# Patient Record
Sex: MALE | RURAL
[De-identification: names, ages, dates, MRNs, and addresses within clinical notes are randomized per-mention and may not be internally consistent; named-entity substitution may affect disease eponyms.]

---

## 2022-09-02 ENCOUNTER — APPOINTMENT (RX ONLY)
Dept: RURAL CLINIC 1 | Facility: CLINIC | Age: 22
Setting detail: DERMATOLOGY
End: 2022-09-02

## 2022-09-02 DIAGNOSIS — D22 MELANOCYTIC NEVI: ICD-10-CM

## 2022-09-02 PROBLEM — D22.4 MELANOCYTIC NEVI OF SCALP AND NECK: Status: ACTIVE | Noted: 2022-09-02

## 2022-09-02 PROBLEM — D22.61 MELANOCYTIC NEVI OF RIGHT UPPER LIMB, INCLUDING SHOULDER: Status: ACTIVE | Noted: 2022-09-02

## 2022-09-02 PROBLEM — D22.62 MELANOCYTIC NEVI OF LEFT UPPER LIMB, INCLUDING SHOULDER: Status: ACTIVE | Noted: 2022-09-02

## 2022-09-02 PROBLEM — D22.5 MELANOCYTIC NEVI OF TRUNK: Status: ACTIVE | Noted: 2022-09-02

## 2022-09-02 PROCEDURE — 99202 OFFICE O/P NEW SF 15 MIN: CPT

## 2022-09-02 PROCEDURE — ? COUNSELING

## 2022-09-02 ASSESSMENT — LOCATION SIMPLE DESCRIPTION DERM
LOCATION SIMPLE: RIGHT ANTERIOR NECK
LOCATION SIMPLE: LEFT UPPER BACK
LOCATION SIMPLE: LEFT LOWER BACK
LOCATION SIMPLE: RIGHT LOWER BACK
LOCATION SIMPLE: LEFT SHOULDER
LOCATION SIMPLE: UPPER BACK
LOCATION SIMPLE: RIGHT SHOULDER

## 2022-09-02 ASSESSMENT — LOCATION DETAILED DESCRIPTION DERM
LOCATION DETAILED: RIGHT POSTERIOR SHOULDER
LOCATION DETAILED: SUPERIOR THORACIC SPINE
LOCATION DETAILED: LEFT INFERIOR UPPER BACK
LOCATION DETAILED: LEFT INFERIOR LATERAL UPPER BACK
LOCATION DETAILED: LEFT MID-UPPER BACK
LOCATION DETAILED: RIGHT SUPERIOR LATERAL MIDBACK
LOCATION DETAILED: LEFT SUPERIOR LATERAL MIDBACK
LOCATION DETAILED: RIGHT CLAVICULAR NECK
LOCATION DETAILED: LEFT POSTERIOR SHOULDER
LOCATION DETAILED: LEFT INFERIOR MEDIAL UPPER BACK

## 2022-09-02 ASSESSMENT — LOCATION ZONE DERM
LOCATION ZONE: ARM
LOCATION ZONE: TRUNK
LOCATION ZONE: NECK

## 2024-10-10 ENCOUNTER — OFFICE VISIT (OUTPATIENT)
Dept: URGENT CARE | Age: 24
End: 2024-10-10
Payer: COMMERCIAL

## 2024-10-10 VITALS
SYSTOLIC BLOOD PRESSURE: 118 MMHG | TEMPERATURE: 97.7 F | RESPIRATION RATE: 16 BRPM | WEIGHT: 216 LBS | HEART RATE: 115 BPM | DIASTOLIC BLOOD PRESSURE: 77 MMHG | OXYGEN SATURATION: 96 %

## 2024-10-10 DIAGNOSIS — R09.81 NASAL CONGESTION: Primary | ICD-10-CM

## 2024-10-10 DIAGNOSIS — H66.91 RIGHT OTITIS MEDIA, UNSPECIFIED OTITIS MEDIA TYPE: ICD-10-CM

## 2024-10-10 PROCEDURE — 99203 OFFICE O/P NEW LOW 30 MIN: CPT | Performed by: REGISTERED NURSE

## 2024-10-10 RX ORDER — AZITHROMYCIN 250 MG/1
TABLET, FILM COATED ORAL
Qty: 6 TABLET | Refills: 0 | Status: SHIPPED | OUTPATIENT
Start: 2024-10-10

## 2024-10-10 ASSESSMENT — ENCOUNTER SYMPTOMS
SINUS PRESSURE: 0
COUGH: 0
SORE THROAT: 0
SINUS PAIN: 0
DIARRHEA: 0
NAUSEA: 1
SHORTNESS OF BREATH: 0
RHINORRHEA: 1
SINUS COMPLAINT: 1
FEVER: 0
VOMITING: 0
HEADACHES: 0

## 2024-10-10 NOTE — PROGRESS NOTES
Subjective   Patient ID: Ugo Castellanos is a 23 y.o. male. They present today with a chief complaint of Nasal Congestion, Sinus Problem, and Nausea.    History of Present Illness    History provided by:  Patient  History limited by: na.   used: No    Sinus Problem  Location:  Sinuses  Quality:  Congestion  Severity:  Mild  Onset quality:  Gradual  Duration:  2 days  Timing:  Constant  Progression:  Waxing and waning  Chronicity:  New  Context:  Nasal congestion  Relieved by:  Nothing  Worsened by:  Nothing  Ineffective treatments:  None tried  Associated symptoms: congestion, nausea and rhinorrhea    Associated symptoms: no cough, no diarrhea, no ear pain, no fever, no headaches, no rash, no shortness of breath, no sore throat and no vomiting        Past Medical History  Allergies as of 10/10/2024    (No Known Allergies)       (Not in a hospital admission)       No past medical history on file.    No past surgical history on file.         Review of Systems  Review of Systems   Constitutional:  Negative for fever.   HENT:  Positive for congestion, postnasal drip and rhinorrhea. Negative for ear pain, sinus pressure, sinus pain and sore throat.    Respiratory:  Negative for cough and shortness of breath.    Gastrointestinal:  Positive for nausea. Negative for diarrhea and vomiting.   Skin:  Negative for rash.   Neurological:  Negative for headaches.                                  Objective    Vitals:    10/10/24 1924   BP: 118/77   BP Location: Left arm   Pulse: (!) 115   Resp: 16   Temp: 36.5 °C (97.7 °F)   SpO2: 96%   Weight: 98 kg (216 lb)     No LMP for male patient.    Physical Exam  Vitals and nursing note reviewed.   Constitutional:       Appearance: Normal appearance.   HENT:      Head: Normocephalic.      Right Ear: Hearing, ear canal and external ear normal. Tympanic membrane is erythematous and retracted.      Left Ear: Hearing, tympanic membrane, ear canal and external ear normal.       Nose: Mucosal edema, congestion and rhinorrhea present. No nasal tenderness. Rhinorrhea is clear.      Right Sinus: No maxillary sinus tenderness or frontal sinus tenderness.      Left Sinus: No maxillary sinus tenderness or frontal sinus tenderness.      Mouth/Throat:      Lips: Pink.      Mouth: Mucous membranes are moist.      Pharynx: Oropharynx is clear.   Eyes:      Pupils: Pupils are equal, round, and reactive to light.   Cardiovascular:      Rate and Rhythm: Normal rate and regular rhythm.   Pulmonary:      Effort: Pulmonary effort is normal.      Breath sounds: Normal breath sounds.   Abdominal:      General: Bowel sounds are normal.      Palpations: Abdomen is soft.   Skin:     General: Skin is warm and dry.      Capillary Refill: Capillary refill takes less than 2 seconds.   Neurological:      General: No focal deficit present.      Mental Status: He is alert.   Psychiatric:         Mood and Affect: Mood normal.         Procedures    Point of Care Test & Imaging Results from this visit  No results found for this visit on 10/10/24.   No results found.    Diagnostic study results (if any) were reviewed by Crystal L Severino, APRN-CNP.    Assessment/Plan   Allergies, medications, history, and pertinent labs/EKGs/Imaging reviewed by Crystal L Severino, APRN-CNP.     Medical Decision Making  23-year-old male patient presents with complaints of nasal congestion and nausea.  He states he thought it was his allergies but symptoms are not subsiding.  Patient denies any fevers, sore throat, cough, headache, nausea or vomiting.  Upon assessment it is noted patient has left otitis media, patient is otherwise hemodynamically stable with stable vital signs.  Patient is discharged to home with Rx for Z-Dago and is to follow-up with his PCP should his symptoms persist or worsen, patient verbalizes understanding has no further questions at this time    Orders and Diagnoses  Diagnoses and all orders for this visit:  Nasal  congestion  Right otitis media, unspecified otitis media type  -     azithromycin (Zithromax) 250 mg tablet; Take 2 tabs (500 mg) by mouth today, than 1 daily for 4 days.      Medical Admin Record      Patient disposition: Home    Electronically signed by Crystal L Severino, APRN-CNP  7:33 PM

## 2024-10-30 RX ORDER — FLUTICASONE PROPIONATE 50 MCG
SPRAY, SUSPENSION (ML) NASAL
COMMUNITY
Start: 2023-11-28 | End: 2024-11-01 | Stop reason: ALTCHOICE

## 2024-10-30 RX ORDER — CITALOPRAM 20 MG/1
1 TABLET, FILM COATED ORAL
COMMUNITY
Start: 2024-09-18 | End: 2024-11-01 | Stop reason: SDUPTHER

## 2024-11-01 ENCOUNTER — APPOINTMENT (OUTPATIENT)
Dept: PRIMARY CARE | Facility: CLINIC | Age: 24
End: 2024-11-01

## 2024-11-01 VITALS
DIASTOLIC BLOOD PRESSURE: 76 MMHG | OXYGEN SATURATION: 97 % | HEIGHT: 74 IN | WEIGHT: 219 LBS | HEART RATE: 86 BPM | BODY MASS INDEX: 28.11 KG/M2 | SYSTOLIC BLOOD PRESSURE: 122 MMHG

## 2024-11-01 DIAGNOSIS — J30.89 ENVIRONMENTAL AND SEASONAL ALLERGIES: ICD-10-CM

## 2024-11-01 DIAGNOSIS — Z00.00 ROUTINE ADULT HEALTH MAINTENANCE: Primary | ICD-10-CM

## 2024-11-01 DIAGNOSIS — F41.8 ANXIETY WITH DEPRESSION: ICD-10-CM

## 2024-11-01 DIAGNOSIS — Z23 NEED FOR VACCINATION: ICD-10-CM

## 2024-11-01 PROBLEM — A04.72 C. DIFFICILE COLITIS: Status: ACTIVE | Noted: 2024-11-01

## 2024-11-01 PROCEDURE — 99385 PREV VISIT NEW AGE 18-39: CPT | Performed by: NURSE PRACTITIONER

## 2024-11-01 PROCEDURE — 3008F BODY MASS INDEX DOCD: CPT | Performed by: NURSE PRACTITIONER

## 2024-11-01 PROCEDURE — 90471 IMMUNIZATION ADMIN: CPT | Performed by: NURSE PRACTITIONER

## 2024-11-01 PROCEDURE — 90656 IIV3 VACC NO PRSV 0.5 ML IM: CPT | Performed by: NURSE PRACTITIONER

## 2024-11-01 RX ORDER — CITALOPRAM 20 MG/1
20 TABLET, FILM COATED ORAL
Qty: 90 TABLET | Refills: 1 | Status: SHIPPED | OUTPATIENT
Start: 2024-11-01 | End: 2025-04-30

## 2024-11-01 ASSESSMENT — ENCOUNTER SYMPTOMS
ABDOMINAL PAIN: 0
MYALGIAS: 0
WOUND: 0
CHILLS: 0
FEVER: 0
DIARRHEA: 0
ROS GI COMMENTS: POSITIVE FOR ACID REFLUX
DIFFICULTY URINATING: 0
ADENOPATHY: 0
DIZZINESS: 0
DEPRESSION: 1
SHORTNESS OF BREATH: 0
FATIGUE: 0
EYE PAIN: 0
CONSTIPATION: 0
COUGH: 0
BRUISES/BLEEDS EASILY: 0
JOINT SWELLING: 0
NAUSEA: 0
SEIZURES: 0
ABDOMINAL DISTENTION: 0
WEAKNESS: 0
BACK PAIN: 0
LOSS OF SENSATION IN FEET: 0
TROUBLE SWALLOWING: 0
PALPITATIONS: 0
COLOR CHANGE: 0
OCCASIONAL FEELINGS OF UNSTEADINESS: 0
WHEEZING: 0
HEADACHES: 0

## 2024-11-01 ASSESSMENT — PATIENT HEALTH QUESTIONNAIRE - PHQ9
SUM OF ALL RESPONSES TO PHQ9 QUESTIONS 1 AND 2: 1
2. FEELING DOWN, DEPRESSED OR HOPELESS: SEVERAL DAYS
10. IF YOU CHECKED OFF ANY PROBLEMS, HOW DIFFICULT HAVE THESE PROBLEMS MADE IT FOR YOU TO DO YOUR WORK, TAKE CARE OF THINGS AT HOME, OR GET ALONG WITH OTHER PEOPLE: NOT DIFFICULT AT ALL
1. LITTLE INTEREST OR PLEASURE IN DOING THINGS: NOT AT ALL

## 2024-11-01 ASSESSMENT — COLUMBIA-SUICIDE SEVERITY RATING SCALE - C-SSRS
1. IN THE PAST MONTH, HAVE YOU WISHED YOU WERE DEAD OR WISHED YOU COULD GO TO SLEEP AND NOT WAKE UP?: NO
6. HAVE YOU EVER DONE ANYTHING, STARTED TO DO ANYTHING, OR PREPARED TO DO ANYTHING TO END YOUR LIFE?: NO
2. HAVE YOU ACTUALLY HAD ANY THOUGHTS OF KILLING YOURSELF?: NO

## 2024-11-01 NOTE — PROGRESS NOTES
Do you have:  Any eye problems:    N  2. Frequent nasal congestion or sneezing:  N  3. Difficulty hearing:  N  4. Ear problems:   N  Are you troubled by:  5. Asthma or wheezing:   N  6. Frequent cough:   N  7. Shortness of breath:N  8. Hemoptysis: N  9. Hx of TB: N  Do you have or have you been told you had:  10. High blood pressure: N  11. Heart disease: N  12. Heart murmur: N  Do you ever have:  13. Chest pain or pressure with exertion:N  14. Leg pains with walking up hill: N  15. Fast heartbeat or palpitations:N  16. Varicose veins: N  Do you have or are you troubled by:  17. Difficulty swallowing foods or liquids: N  18. Abdominal pains: N  19. Frequent indigestion or heartburn: Y  20. Constipation: N  21. Diarrhea or loose stools: N  Has there been a definite change:  22. In weight recently: N  23. In bowel movements: N  Have you ever had or been told you have:  24. An ulcer: N  25. Black stools: N  26. Jaundice, hepatitis or liver problems: N  27. Gallstones or gallbladder problems: N  28. Stomach or intestinal problems: N  Have you ever:  29. Vomited blood : N  30. Blood in bowel movements: N  31. Been anemic or been treated for blood problems: N  32. Had sickle cell trait or anemia: N  33. Been refused as a blood donor:   Have you had or do you have:  34. Problems with your kidney, bladder, or prostate: N  35. Loss of control of your urine: N  36. Pain or burning with urination: N  37. Blood in your urine: N  38. Trouble starting flow of urine: N  39. Frequent urination at night: N  Have you ever been treated for or told you had:  40. Venereal disease: N  Do you have:  41. Any skin problems: N  42. Diabetes: N  43. Thyroid disease: N  Are you troubled by:  44. Frequent back pain: N  45. Pain or swelling around joints: N  Have you ever:  46. Broken any bones: Y  Are you troubled by:  47. Frequent headaches: N  48. Dizziness: N  49. Had Seizures or convulsions: N  50. Temporarily lost control of your hand or  foot : N   51. Had a stroke or been paralyzed : N  52. Temporarily lost your ability to speak: N  53. Fainted or lost consciousness: N  Have you ever had:  54. Hallucinations: N  55. Much trouble with Nervousness:  Sometimes   56. Do you take medications for your nerves: Y   57. Trouble falling asleep or staying asleep: N  58. Do you feel tired even after a good night sleep: N  59. Do you often feel down in the dumps or depressed: Y  60. Do you often feel like crying without any reason: N  61. Do you think that you may be using alcohol excessively: N  62. Do you use any street drugs : N     Do have any other medical problems that are concerning to you :      Subjective   Patient ID: Ugo Castellanos is a 23 y.o. male who presents for Establish Care and Annual Exam.    Patient is seen today in order to establish primary care as well as complete an annual physical exam.  Patient recently moved to [place] with his family with whom he lives with.  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  Discussed with patient seasonal allergies.  He reports that his symptoms are typically controlled with over-the-counter Claritin or Zyrtec in addition to Flonase and Sudafed.  No recently reported allergist follow-up completed.  Patient denies any associated shortness of breath, cough, wheezing or chest pain.   Patient reports gaining about 30 pounds over the past year.  He states that he eats relatively frequently throughout the day and has also developed some GERD which is relieved with Prilosec.  He states he wants to start working out as a means to lose weight.  He has helpful that this will help with his acid reflux.  No reported issues with appetite, staying hydrated, bowel or bladder.  He reports liking football and typically only drinks while tailgating or watching the games.  He does not smoke.  Outside of work not a lot of physical activity reported.  No associated shortness of breath or  chest pain on exertion.  Patient states that his anxiety and depression are well-controlled with current medication regiment.  He has seen a counselor in the past but does not feel he needs to do so at this time.  Good support system reported with family.  Medications reviewed.  No other acute concerns voiced at this time           No current outpatient medications on file prior to visit.     No current facility-administered medications on file prior to visit.       Past Medical History:   Diagnosis Date    Depression         Past Surgical History:   Procedure Laterality Date    WISDOM TOOTH EXTRACTION          Family History   Problem Relation Name Age of Onset    Breast cancer Mother Chikis Castellanos     Colon cancer Paternal Grandfather          Review of Systems   Constitutional:  Negative for chills, fatigue and fever.   HENT:  Negative for dental problem and trouble swallowing.    Eyes:  Negative for pain and visual disturbance.   Respiratory:  Negative for cough, shortness of breath and wheezing.    Cardiovascular:  Negative for chest pain, palpitations and leg swelling.   Gastrointestinal:  Negative for abdominal distention, abdominal pain, constipation, diarrhea and nausea.        Positive for acid reflux   Endocrine: Negative for cold intolerance and heat intolerance.   Genitourinary:  Negative for difficulty urinating.   Musculoskeletal:  Negative for back pain, gait problem, joint swelling and myalgias.   Skin:  Negative for color change, pallor, rash and wound.   Allergic/Immunologic: Positive for environmental allergies. Negative for food allergies.   Neurological:  Negative for dizziness, seizures, weakness and headaches.   Hematological:  Negative for adenopathy. Does not bruise/bleed easily.   Psychiatric/Behavioral:  Negative for behavioral problems.         Positive for anxiety/ depression (well controlled)   All other systems reviewed and are negative.      Objective   /76   Pulse 86   Ht  "1.88 m (6' 2\")   Wt 99.3 kg (219 lb)   SpO2 97%   BMI 28.12 kg/m²     Physical Exam  Constitutional:       General: He is not in acute distress.     Appearance: Normal appearance. He is not toxic-appearing.   HENT:      Head: Normocephalic and atraumatic.      Right Ear: Tympanic membrane, ear canal and external ear normal.      Left Ear: Tympanic membrane, ear canal and external ear normal.      Nose: Nose normal.      Mouth/Throat:      Mouth: Mucous membranes are moist.      Pharynx: Oropharynx is clear.   Eyes:      Extraocular Movements: Extraocular movements intact.      Conjunctiva/sclera: Conjunctivae normal.      Pupils: Pupils are equal, round, and reactive to light.   Cardiovascular:      Rate and Rhythm: Normal rate and regular rhythm.      Pulses: Normal pulses.      Heart sounds: Normal heart sounds. No murmur heard.  Pulmonary:      Effort: Pulmonary effort is normal.      Breath sounds: Normal breath sounds. No wheezing.   Abdominal:      General: Bowel sounds are normal.      Palpations: Abdomen is soft.   Musculoskeletal:         General: No swelling. Normal range of motion.      Cervical back: Normal range of motion and neck supple.   Skin:     General: Skin is warm and dry.   Neurological:      General: No focal deficit present.      Mental Status: He is alert and oriented to person, place, and time. Mental status is at baseline.      Cranial Nerves: No cranial nerve deficit.      Motor: No weakness.   Psychiatric:         Mood and Affect: Mood normal.         Behavior: Behavior normal.         Thought Content: Thought content normal.         Judgment: Judgment normal.         Assessment/Plan   Problem List Items Addressed This Visit             ICD-10-CM    Anxiety with depression F41.8     Stable on current Celexa dosing.  Patient declined the need for counseling follow-up at this time.  Provider to be notified for any persistent/worsening mood concerns.         Relevant Medications    " citalopram (CeleXA) 20 mg tablet    Routine adult health maintenance - Primary Z00.00     No indication for screening blood work at this time.  Will continue to monitor as appropriate         Environmental and seasonal allergies J30.89     Stable with current over-the-counter regiment.  Consider allergy follow-up for any persistent/worsening symptoms          Other Visit Diagnoses         Codes    Need for vaccination     Z23    Relevant Orders    Flu vaccine, trivalent, preservative free, age 6 months and greater (Fluarix/Fluzone/Flulaval) (Completed)

## 2024-11-13 PROBLEM — Z00.00 ROUTINE ADULT HEALTH MAINTENANCE: Status: ACTIVE | Noted: 2024-11-13

## 2024-11-13 PROBLEM — J30.89 ENVIRONMENTAL AND SEASONAL ALLERGIES: Status: ACTIVE | Noted: 2024-11-13

## 2024-11-14 NOTE — ASSESSMENT & PLAN NOTE
Stable on current Celexa dosing.  Patient declined the need for counseling follow-up at this time.  Provider to be notified for any persistent/worsening mood concerns.

## 2024-11-14 NOTE — ASSESSMENT & PLAN NOTE
Stable with current over-the-counter regiment.  Consider allergy follow-up for any persistent/worsening symptoms

## 2025-01-27 ENCOUNTER — OFFICE VISIT (OUTPATIENT)
Dept: URGENT CARE | Age: 25
End: 2025-01-27
Payer: COMMERCIAL

## 2025-01-27 VITALS
WEIGHT: 220 LBS | DIASTOLIC BLOOD PRESSURE: 68 MMHG | OXYGEN SATURATION: 98 % | SYSTOLIC BLOOD PRESSURE: 111 MMHG | BODY MASS INDEX: 28.25 KG/M2 | TEMPERATURE: 98.4 F | RESPIRATION RATE: 19 BRPM | HEART RATE: 112 BPM

## 2025-01-27 DIAGNOSIS — R50.9 FEVER, UNSPECIFIED FEVER CAUSE: ICD-10-CM

## 2025-01-27 DIAGNOSIS — R05.9 COUGH, UNSPECIFIED TYPE: ICD-10-CM

## 2025-01-27 DIAGNOSIS — H61.21 IMPACTED CERUMEN OF RIGHT EAR: Primary | ICD-10-CM

## 2025-01-27 DIAGNOSIS — H66.91 RIGHT OTITIS MEDIA, UNSPECIFIED OTITIS MEDIA TYPE: ICD-10-CM

## 2025-01-27 LAB
POC RAPID INFLUENZA A: NEGATIVE
POC RAPID INFLUENZA B: NEGATIVE
POC SARS-COV-2 AG BINAX: NORMAL

## 2025-01-27 PROCEDURE — 87811 SARS-COV-2 COVID19 W/OPTIC: CPT

## 2025-01-27 PROCEDURE — 1036F TOBACCO NON-USER: CPT

## 2025-01-27 PROCEDURE — 99213 OFFICE O/P EST LOW 20 MIN: CPT

## 2025-01-27 PROCEDURE — 87804 INFLUENZA ASSAY W/OPTIC: CPT

## 2025-01-27 RX ORDER — ACETAMINOPHEN 500 MG
1000 TABLET ORAL ONCE
Status: COMPLETED | OUTPATIENT
Start: 2025-01-27 | End: 2025-01-27

## 2025-01-27 RX ORDER — AZITHROMYCIN 250 MG/1
TABLET, FILM COATED ORAL
Qty: 6 TABLET | Refills: 0 | Status: SHIPPED | OUTPATIENT
Start: 2025-01-27

## 2025-01-27 RX ADMIN — Medication 1000 MG: at 16:05

## 2025-01-27 NOTE — PROGRESS NOTES
Subjective   Patient ID: Ugo Castellanos is a 24 y.o. male. They present today with a chief complaint of Fever (Started yesterday, 102F at home, mild cough, fatigue, no other symptoms.).    History of Present Illness  See mdm           Past Medical History  Allergies as of 01/27/2025 - Reviewed 01/27/2025   Allergen Reaction Noted    Amoxicillin Diarrhea 11/01/2024       (Not in a hospital admission)       Past Medical History:   Diagnosis Date    Depression        Past Surgical History:   Procedure Laterality Date    WISDOM TOOTH EXTRACTION          reports that he has never smoked. He has never used smokeless tobacco. He reports current alcohol use of about 2.0 standard drinks of alcohol per week. He reports that he does not currently use drugs after having used the following drugs: Marijuana.    Review of Systems  Review of Systems   All other systems reviewed and are negative.                                 Objective    Vitals:    01/27/25 1525 01/27/25 1628   BP: 111/68    BP Location: Left arm    Patient Position: Sitting    BP Cuff Size: Small adult    Pulse: (!) 123 (!) 112   Resp: 19    Temp: 36.9 °C (98.4 °F)    TempSrc: Oral    SpO2: 98%    Weight: 99.8 kg (220 lb)      No LMP for male patient.    Physical Exam  Vitals and nursing note reviewed.   Constitutional:       General: He is not in acute distress.     Appearance: Normal appearance. He is normal weight. He is not ill-appearing, toxic-appearing or diaphoretic.   HENT:      Head: Normocephalic and atraumatic.      Right Ear: Tympanic membrane, ear canal and external ear normal. There is impacted cerumen.      Left Ear: Tympanic membrane, ear canal and external ear normal.      Nose: Nose normal.      Mouth/Throat:      Mouth: Mucous membranes are moist.      Pharynx: No oropharyngeal exudate or posterior oropharyngeal erythema.   Eyes:      General: No scleral icterus.        Right eye: No discharge.         Left eye: No discharge.      Extraocular  Movements: Extraocular movements intact.      Conjunctiva/sclera: Conjunctivae normal.      Pupils: Pupils are equal, round, and reactive to light.   Cardiovascular:      Rate and Rhythm: Regular rhythm. Tachycardia present.      Pulses: Normal pulses.      Heart sounds: No murmur heard.     No friction rub. No gallop.   Pulmonary:      Effort: Pulmonary effort is normal. No respiratory distress.      Breath sounds: No stridor. No wheezing, rhonchi or rales.   Abdominal:      General: Abdomen is flat.      Tenderness: There is no abdominal tenderness. There is no guarding or rebound.   Musculoskeletal:      Cervical back: Normal range of motion and neck supple. No rigidity or tenderness.      Right lower leg: No edema.      Left lower leg: No edema.      Comments: No calf or popliteal tenderness.  No lower extremity edema. LE WWP, sensation intact capillary fill time less than 2 seconds    Skin:     General: Skin is warm and dry.      Capillary Refill: Capillary refill takes less than 2 seconds.   Neurological:      General: No focal deficit present.      Mental Status: He is alert.   Psychiatric:         Mood and Affect: Mood normal.         Behavior: Behavior normal.         Procedures    Point of Care Test & Imaging Results from this visit  Results for orders placed or performed in visit on 01/27/25   POCT Influenza A/B manually resulted   Result Value Ref Range    POC Rapid Influenza A Negative Negative    POC Rapid Influenza B Negative Negative   POCT Covid-19 Rapid Antigen   Result Value Ref Range    POC BREANN-COV-2 AG  Presumptive negative test for SARS-CoV-2 (no antigen detected)     Presumptive negative test for SARS-CoV-2 (no antigen detected)      No results found.    Diagnostic study results (if any) were reviewed by Josephine Zamarripa PA-C.    Assessment/Plan   Allergies, medications, history, and pertinent labs/EKGs/Imaging reviewed by Josephine Zamarripa PA-C.     Medical Decision Making  24 year old male  presents with complaint of cough, nasal congestion and fever.  Symptoms began yesterday evening around 7 pm.  102 fever yesterday.  No CP, SOB.  Minimal sputum production.  He has history of frequent ear infections.  Exam with right cerumen impaction, flushed with right otitis media following clearance of cerumen.  TM is intact.  External auditory canals and mastoids normal.  Remainder of exam is benign.  Offered, discussed chest x-ray given tachycardia, fever and cough.  Patient does not feel cough is very severe.  He prefers trial treatment for otitis media and will return for chest x-ray if cough worsens.  He reports Clostridium difficile infection with amoxicillin in the past and requests azithromycin instead.  He states that he is taken this several times in the past and tolerates it well.  No features of meningitis, bacterial sinusitis, pneumonia, reactive airway or other emergency.  Encouraged follow-up with primary care provider.  Discussed indications for presentation to emergency department.  Discharged good condition agreeable to plan as discussed.       Orders and Diagnoses  Diagnoses and all orders for this visit:  Impacted cerumen of right ear  Cough, unspecified type  -     POCT Influenza A/B manually resulted  -     POCT Covid-19 Rapid Antigen  Fever, unspecified fever cause  -     acetaminophen (Tylenol) tablet 1,000 mg  Right otitis media, unspecified otitis media type  -     azithromycin (Zithromax) 250 mg tablet; Take 500 mg (2 tabs) first day, take 1 tab once daily for four days following      Medical Admin Record  Administrations This Visit       acetaminophen (Tylenol) tablet 1,000 mg       Admin Date  01/27/2025 Action  Given Dose  1,000 mg Route  oral Documented By  Salvador Evangelista MA                    Patient disposition: Home    Electronically signed by Josephine Zamarripa PA-C  4:34 PM

## 2025-01-28 ENCOUNTER — TELEPHONE (OUTPATIENT)
Dept: PRIMARY CARE | Facility: CLINIC | Age: 25
End: 2025-01-28
Payer: COMMERCIAL

## 2025-01-28 NOTE — TELEPHONE ENCOUNTER
Pt was informed to continue with the meds and yasmine on Monday if not better and/or sooner if fevers or symptoms get worse

## 2025-01-28 NOTE — TELEPHONE ENCOUNTER
"Pt states he was running a fever of 104 and went to Urgent Care.  Tested neg for covid and influenza's.  They started him on an antibiotic, and he has been \"piggy-backing\" motrin and tylenol.  His temperature still only went down to 101.5 and is now 102.9 this afternoon again.  He is not sure if this is normal?  Or when to be concerned?   " no

## 2025-01-30 ENCOUNTER — HOSPITAL ENCOUNTER (OUTPATIENT)
Dept: RADIOLOGY | Facility: CLINIC | Age: 25
Discharge: HOME | End: 2025-01-30
Payer: COMMERCIAL

## 2025-01-30 ENCOUNTER — OFFICE VISIT (OUTPATIENT)
Dept: PRIMARY CARE | Facility: CLINIC | Age: 25
End: 2025-01-30
Payer: COMMERCIAL

## 2025-01-30 VITALS
DIASTOLIC BLOOD PRESSURE: 61 MMHG | TEMPERATURE: 98.5 F | BODY MASS INDEX: 28.13 KG/M2 | WEIGHT: 219.13 LBS | SYSTOLIC BLOOD PRESSURE: 89 MMHG | OXYGEN SATURATION: 95 % | HEART RATE: 85 BPM

## 2025-01-30 DIAGNOSIS — J06.9 VIRAL URI WITH COUGH: ICD-10-CM

## 2025-01-30 DIAGNOSIS — J06.9 VIRAL URI WITH COUGH: Primary | ICD-10-CM

## 2025-01-30 PROCEDURE — 99213 OFFICE O/P EST LOW 20 MIN: CPT | Performed by: FAMILY MEDICINE

## 2025-01-30 PROCEDURE — 71046 X-RAY EXAM CHEST 2 VIEWS: CPT

## 2025-01-30 RX ORDER — LACTOBACILLUS RHAMNOSUS GG 10B CELL
1 CAPSULE ORAL DAILY
COMMUNITY

## 2025-01-30 RX ORDER — CETIRIZINE HYDROCHLORIDE 10 MG/1
10 TABLET ORAL DAILY
COMMUNITY

## 2025-01-30 RX ORDER — BENZONATATE 100 MG/1
100-200 CAPSULE ORAL 3 TIMES DAILY PRN
Qty: 60 CAPSULE | Refills: 0 | Status: SHIPPED | OUTPATIENT
Start: 2025-01-30 | End: 2025-02-09

## 2025-01-30 ASSESSMENT — ENCOUNTER SYMPTOMS
SHORTNESS OF BREATH: 0
SINUS PAIN: 1
SINUS PRESSURE: 1
CHILLS: 1
RHINORRHEA: 1
FEVER: 1
COUGH: 1
DIARRHEA: 1

## 2025-01-30 NOTE — PATIENT INSTRUCTIONS
Anticipate usual course of viral upper respiratory illness. Worst of symptoms typically lasting for about 7 days, often peaking around day 5. Improvement should typically begin between days 7-10 of illness. Resolution of symptoms typically within 2-3 weeks. Some things that might indicate something else is going on, or has developed: Fever starting after day 5 of illness, worsening of condition after day 7 of illness, not beginning to have improvement by day 10 of illness, fever (100.4 or higher) going away for 48 hours then returning, sharp stabbing ear pain, pain/redness/swelling localized over a sinus cavity, or severe or rapidly worsening symptoms.    If appropriate, Afrin/oxymetazoline for 3 days can be very helpful, for teens and adults (children 6-12 only with adult supervision). Nasal steroids (e.g., Flonase, Nasacort, etc.) may be a safer option, though not as effective. Nasal saline can also help thin the mucus to make it drain out more easily. A nasal antihistamine spray (e.g., Azelastine) can be very helpful for allergies, as can nasal steroid sprays.    For a sore throat, you may try salt water gargles, straight honey. Adults may try Cepacol lozenges, Chloraseptic throat spray.    For a cough, you may try Delsym/dextromethorphan. Adults may try Coricidin HBP, Benzonatate/Tessalon Perles, cough drops.    Please return or seek medical attention if symptoms persist, change, worsen, or return. For emergencies, call 9-1-1 or go to the nearest Emergency Room. Please schedule additional problem-focused appointment(s) to address additional problem(s).    Avoid taking Biotin (a vitamin, shows up particularly in hair/nail supplements) for a week prior to any blood tests, as it can interfere with certain results. Fasting for labs means 12 hours, nothing to eat or drink, except water and medications, unless directed otherwise.    For assistance with scheduling referrals or consultations, please call [phone].  For laboratory, radiology, and other tests, please call [phone] ([phone] for pediatrics). Please review prescription inserts and published information for possible adverse effects of all medications. Return after testing or consultation to review results and recommendations, if symptoms persist, change, worsen, or return, or if you have any question or concern. If you do not get results within 7-10 days, or you have any question or concern, please send a message, call the office ([phone]), or return to the office for a follow-up appointment. For non-emergencies, you may call the office. For emergencies, call 9-1-1 or go to the nearest Emergency Department. Please schedule additional appointment(s) to address concern(s) not addressed today. An annual Wellness visit is strongly recommended. A Wellness visit should be dedicated to addressing routine health maintenance matters (e.g., cancer screenings, cardiovascular screening, etc.). Problem-focused visits, typically prompted by symptoms or specific concerns, are usually conducted separately, particularly if multiple or complex problems need to be addressed.    In general, results are not released or discussed over the telephone, but at an appointment or via  [employer]. Results of tests done through [facility] are released via  [employer] (see below).  [email or web address]   [employer] support line: [phone]

## 2025-01-30 NOTE — PROGRESS NOTES
Subjective   Patient ID: Ugo Castellanos is a 24 y.o. male who presents for Fever (Pt presents with mother in Urgent Care F/U, c/o fevers , some cough, negative Covid and Flu done Monday at Urgent Care.).  HPI Historian(s): Self and Mother    c/o fever up to 104 F, cough, fatigue, chills, emesis x1, frontal sinus pressure. Last fever this morning 103.5 F, took Advil or Tylenol.  Onset of symptoms was 4 days ago.  Symptoms have been unchanged since that time.  Severity is moderate  Tried anti-pyretics (helped), Azithromycin (didn't help), Robitussin (helped).   Improved or relieved by Robitussin.  Exacerbated by nothing.  Also c/o decreased appetite, diarrhea, h/o ear infections.  Denies ill contacts, ear pain  Denies chest/side/back pain, pain with deep inhalation, SOB, difficulty breathing, leg pain/swelling/bruising/redness.   Has 2 more doses of Azithromycin.    Review of Systems   Constitutional:  Positive for chills and fever.   HENT:  Positive for congestion, rhinorrhea, sinus pressure and sinus pain.    Respiratory:  Positive for cough. Negative for shortness of breath.    Cardiovascular:  Negative for chest pain.   Gastrointestinal:  Positive for diarrhea.     No LMP for male patient.    Patient Care Team:  ANTHONY Terrazas-CNP as PCP - General (Internal Medicine)    Current Outpatient Medications   Medication Instructions    azithromycin (Zithromax) 250 mg tablet Take 500 mg (2 tabs) first day, take 1 tab once daily for four days following    benzonatate (TESSALON) 100-200 mg, oral, 3 times daily PRN, Do not crush or chew.    cetirizine (ZYRTEC) 10 mg, Daily    citalopram (CELEXA) 20 mg, oral, Daily (0630)    lactobacillus (Culturelle) 10 billion cell capsule 1 capsule, Daily       Objective   BP 89/61   Pulse 85   Temp 36.9 °C (98.5 °F)   Wt 99.4 kg (219 lb 2 oz)   SpO2 95%   BMI 28.13 kg/m²           Physical Exam  Vitals and nursing note reviewed.   Constitutional:       General: He is not in  acute distress.     Appearance: Normal appearance. He is not diaphoretic.      Comments: No assistive device presently being used.   HENT:      Head: Normocephalic and atraumatic.      Right Ear: Tympanic membrane, ear canal and external ear normal. There is no impacted cerumen.      Left Ear: Tympanic membrane, ear canal and external ear normal. There is no impacted cerumen.      Nose: Congestion and rhinorrhea present.      Mouth/Throat:      Mouth: Mucous membranes are moist.      Pharynx: Oropharynx is clear. No posterior oropharyngeal erythema.   Eyes:      General: No scleral icterus.     Extraocular Movements: Extraocular movements intact.      Conjunctiva/sclera: Conjunctivae normal.   Cardiovascular:      Rate and Rhythm: Normal rate and regular rhythm.      Heart sounds: Normal heart sounds.   Pulmonary:      Effort: Pulmonary effort is normal. No respiratory distress.      Breath sounds: Normal breath sounds. No wheezing, rhonchi or rales.   Abdominal:      General: Bowel sounds are normal. There is no distension.      Palpations: Abdomen is soft. There is no mass.      Tenderness: There is abdominal tenderness (mild LLQ). There is no guarding or rebound.   Musculoskeletal:      Cervical back: Normal range of motion and neck supple. No tenderness.      Right lower leg: No edema.      Left lower leg: No edema.   Lymphadenopathy:      Cervical: No cervical adenopathy.   Skin:     General: Skin is warm and dry.      Coloration: Skin is not jaundiced.   Neurological:      General: No focal deficit present.      Mental Status: He is alert and oriented to person, place, and time. Mental status is at baseline.   Psychiatric:         Mood and Affect: Mood normal.         Behavior: Behavior normal.         Thought Content: Thought content normal.         Assessment & Plan  Viral URI with cough  Day 4, supportive care. Defers CXR.  Orders:    benzonatate (Tessalon) 100 mg capsule; Take 1-2 capsules (100-200 mg) by  mouth 3 times a day as needed for cough for up to 10 days. Do not crush or chew.    XR chest 2 views; Future

## 2025-01-31 DIAGNOSIS — J18.9 PNEUMONIA OF RIGHT UPPER LOBE DUE TO INFECTIOUS ORGANISM: Primary | ICD-10-CM

## 2025-01-31 PROBLEM — J06.9 VIRAL URI WITH COUGH: Status: ACTIVE | Noted: 2025-01-31

## 2025-01-31 RX ORDER — AZITHROMYCIN 500 MG/1
500 TABLET, FILM COATED ORAL DAILY
Qty: 5 TABLET | Refills: 0 | Status: SHIPPED | OUTPATIENT
Start: 2025-01-31 | End: 2025-02-05

## 2025-01-31 RX ORDER — CEFPODOXIME PROXETIL 200 MG/1
200 TABLET, FILM COATED ORAL 2 TIMES DAILY
Qty: 20 TABLET | Refills: 0 | Status: SHIPPED | OUTPATIENT
Start: 2025-01-31 | End: 2025-02-10

## 2025-02-03 ENCOUNTER — APPOINTMENT (OUTPATIENT)
Dept: RADIOLOGY | Facility: HOSPITAL | Age: 25
End: 2025-02-03
Payer: COMMERCIAL

## 2025-02-03 ENCOUNTER — HOSPITAL ENCOUNTER (EMERGENCY)
Facility: HOSPITAL | Age: 25
Discharge: HOME | End: 2025-02-03
Payer: COMMERCIAL

## 2025-02-03 VITALS
HEART RATE: 101 BPM | SYSTOLIC BLOOD PRESSURE: 117 MMHG | HEIGHT: 74 IN | DIASTOLIC BLOOD PRESSURE: 71 MMHG | TEMPERATURE: 99 F | RESPIRATION RATE: 18 BRPM | BODY MASS INDEX: 27.47 KG/M2 | OXYGEN SATURATION: 95 % | WEIGHT: 214.07 LBS

## 2025-02-03 DIAGNOSIS — J18.9 PNEUMONIA DUE TO INFECTIOUS ORGANISM, UNSPECIFIED LATERALITY, UNSPECIFIED PART OF LUNG: Primary | ICD-10-CM

## 2025-02-03 LAB
FLUAV RNA RESP QL NAA+PROBE: NOT DETECTED
FLUBV RNA RESP QL NAA+PROBE: NOT DETECTED
RSV RNA RESP QL NAA+PROBE: NOT DETECTED
SARS-COV-2 RNA RESP QL NAA+PROBE: NOT DETECTED

## 2025-02-03 PROCEDURE — 71046 X-RAY EXAM CHEST 2 VIEWS: CPT | Mod: FOREIGN READ | Performed by: RADIOLOGY

## 2025-02-03 PROCEDURE — 2500000002 HC RX 250 W HCPCS SELF ADMINISTERED DRUGS (ALT 637 FOR MEDICARE OP, ALT 636 FOR OP/ED): Performed by: NURSE PRACTITIONER

## 2025-02-03 PROCEDURE — 87637 SARSCOV2&INF A&B&RSV AMP PRB: CPT | Performed by: NURSE PRACTITIONER

## 2025-02-03 PROCEDURE — 71046 X-RAY EXAM CHEST 2 VIEWS: CPT

## 2025-02-03 PROCEDURE — 94640 AIRWAY INHALATION TREATMENT: CPT

## 2025-02-03 PROCEDURE — 99284 EMERGENCY DEPT VISIT MOD MDM: CPT | Mod: 25

## 2025-02-03 RX ORDER — PROMETHAZINE HYDROCHLORIDE AND DEXTROMETHORPHAN HYDROBROMIDE 6.25; 15 MG/5ML; MG/5ML
5 SYRUP ORAL 4 TIMES DAILY PRN
Qty: 118 ML | Refills: 0 | Status: SHIPPED | OUTPATIENT
Start: 2025-02-03 | End: 2025-02-10

## 2025-02-03 RX ORDER — IPRATROPIUM BROMIDE AND ALBUTEROL SULFATE 2.5; .5 MG/3ML; MG/3ML
3 SOLUTION RESPIRATORY (INHALATION) ONCE
Status: COMPLETED | OUTPATIENT
Start: 2025-02-03 | End: 2025-02-03

## 2025-02-03 RX ORDER — ALBUTEROL SULFATE 90 UG/1
2 INHALANT RESPIRATORY (INHALATION) EVERY 6 HOURS PRN
Qty: 8 G | Refills: 0 | Status: SHIPPED | OUTPATIENT
Start: 2025-02-03

## 2025-02-03 RX ADMIN — IPRATROPIUM BROMIDE AND ALBUTEROL SULFATE 3 ML: 2.5; .5 SOLUTION RESPIRATORY (INHALATION) at 15:53

## 2025-02-03 ASSESSMENT — COLUMBIA-SUICIDE SEVERITY RATING SCALE - C-SSRS
1. IN THE PAST MONTH, HAVE YOU WISHED YOU WERE DEAD OR WISHED YOU COULD GO TO SLEEP AND NOT WAKE UP?: NO
2. HAVE YOU ACTUALLY HAD ANY THOUGHTS OF KILLING YOURSELF?: NO
6. HAVE YOU EVER DONE ANYTHING, STARTED TO DO ANYTHING, OR PREPARED TO DO ANYTHING TO END YOUR LIFE?: NO

## 2025-02-03 ASSESSMENT — PAIN - FUNCTIONAL ASSESSMENT: PAIN_FUNCTIONAL_ASSESSMENT: 0-10

## 2025-02-03 ASSESSMENT — PAIN DESCRIPTION - DESCRIPTORS: DESCRIPTORS: DULL

## 2025-02-03 ASSESSMENT — PAIN DESCRIPTION - PROGRESSION: CLINICAL_PROGRESSION: NOT CHANGED

## 2025-02-03 ASSESSMENT — PAIN SCALES - GENERAL: PAINLEVEL_OUTOF10: 3

## 2025-02-03 ASSESSMENT — PAIN DESCRIPTION - ONSET: ONSET: SUDDEN

## 2025-02-03 ASSESSMENT — PAIN DESCRIPTION - PAIN TYPE: TYPE: ACUTE PAIN

## 2025-02-03 ASSESSMENT — PAIN DESCRIPTION - LOCATION: LOCATION: THROAT

## 2025-02-03 ASSESSMENT — PAIN DESCRIPTION - FREQUENCY: FREQUENCY: INTERMITTENT

## 2025-02-03 NOTE — ED PROVIDER NOTES
HPI   Chief Complaint   Patient presents with    Fever     Last Monday I was diagnosed with pneumonia the doctor sts that if I had fever of 100.4 that I should go to the er I have been vomiting and I had a fever of 100.6 after taking 2 antibiotics        HPI  See my MDM      Patient History   Past Medical History:   Diagnosis Date    Depression      Past Surgical History:   Procedure Laterality Date    WISDOM TOOTH EXTRACTION       Family History   Problem Relation Name Age of Onset    Breast cancer Mother Chikis Castellanos     Colon cancer Paternal Grandfather       Social History     Tobacco Use    Smoking status: Never    Smokeless tobacco: Never   Substance Use Topics    Alcohol use: Yes     Alcohol/week: 2.0 standard drinks of alcohol     Types: 2 Cans of beer per week     Comment: Socially    Drug use: Not Currently     Types: Marijuana       Physical Exam   ED Triage Vitals [02/03/25 1534]   Temperature Heart Rate Respirations BP   37.2 °C (99 °F) (!) 101 18 117/71      Pulse Ox Temp Source Heart Rate Source Patient Position   95 % Temporal Monitor Sitting      BP Location FiO2 (%)     Left arm --       Physical Exam  CONSTITUTIONAL: Vital signs reviewed as charted, well-developed and in no distress  Eyes: Extraocular muscles are intact. Pupils equal round and reactive to light. Conjunctiva are pink.    ENT: Mucous membranes are moist. Tongue in the midline. Pharynx was without erythema or exudates, uvula midline  LUNGS: Breath sounds equal and clear to auscultation. Good air exchange, no wheezes rales or retractions, pulse oximetry is charted.  HEART: Regular rate and rhythm without murmur thrill or rub, strong tones, auscultation is normal.  ABDOMEN: Soft and nontender without guarding rebound rigidity or mass. Bowel sounds are present and normal in all quadrants. There is no palpable masses or aneurysms identified. No hepatosplenomegaly, normal abdominal exam.  Neuro: The patient is awake, alert and oriented  ×3. Moving all 4 extremities and answering questions appropriately.   MUSCULOSKELETAL: The calves are nontender to palpation. Full gross active range of motion.   PSYCH: Awake alert oriented, normal mood and affect.  Skin:  Dry, normal color, warm to the touch, no rash present.        ED Course & MDM   Diagnoses as of 02/03/25 1845   Pneumonia due to infectious organism, unspecified laterality, unspecified part of lung                 No data recorded     [place] Coma Scale Score: 15 (02/03/25 1530 : Toribio Gill, EMT)                           Medical Decision Making  History obtained from: patient    Vital signs, nursing notes, current medications, past medical history, Surgical history, allergies, social history, family History were reviewed.         HPI:  Patient 24-year-old male present emergency room today stating he was diagnosed with pneumonia couple days ago and was started on antibiotic 4 days ago.  His doctor told if he still having any fevers she should come to the emergency room.  Using a tympanic thermometer he had 100.7 fever did not take any medication came here to the ER using oral thermometer his fever was normal.  Denies dizziness, chest pain, shortness of breath, abdominal pain extremity edema.  Nontoxic well-appearing      10 point ROS was reviewed and negative except Noted above in HPI.  DDX: as listed above          MDM Summary/considerations:  Labs Reviewed   SARS-COV-2 PCR - Normal       Result Value    Coronavirus 2019, PCR Not Detected      Narrative:     This assay is an FDA-cleared, in vitro diagnostic nucleic acid amplification test for the qualitative detection and differentiation of SARS CoV-2 from nasopharyngeal specimens collected from individuals with signs and symptoms of respiratory tract infections, and has been validated for use at [facility]. Negative results do not preclude COVID-19 infections and should not be used as the sole basis for diagnosis,  treatment, or other management decisions. Testing for SARS CoV-2 is recommended only for patients who meet current clinical and/or epidemiological criteria defined by federal, state, or local public health directives.   INFLUENZA A AND B PCR - Normal    Flu A Result Not Detected      Flu B Result Not Detected      Narrative:     This assay is an in vitro diagnostic multiplex nucleic acid amplification test for the detection and discrimination of Influenza A & B from nasopharyngeal specimens, and has been validated for use at [place]. Negative results do not preclude Influenza A/B infections, and should not be used as the sole basis for diagnosis, treatment, or other management decisions. If Influenza A/B and RSV PCR results are negative, testing for Parainfluenza virus, Adenovirus and Metapneumovirus is routinely performed for [facility] [place] in[place], and is available on other patients by placing an add-on request.   RSV PCR - Normal    RSV PCR Not Detected      Narrative:     This assay is an FDA-cleared, in vitro diagnostic nucleic acid amplification test for the detection of RSV from nasopharyngeal specimens, and has been validated for use at [place]. Negative results do not preclude RSV infections, and should not be used as the sole basis for diagnosis, treatment, or other management decisions. If Influenza A/B and RSV PCR results are negative, testing for Parainfluenza virus, Adenovirus and Metapneumovirus is routinely performed for pediatric oncology and intensive care inpatients at [facility], and is available on other patients by placing an add-on request.         XR chest 2 views   Final Result   Patchy consolidation in the inferior aspect of the right upper lobe   similar compared to 1/30/2025.   Signed by Braeden Wyatt MD        Medications   ipratropium-albuteroL (Duo-Neb) 0.5-2.5 mg/3 mL nebulizer solution 3 mL (3 mL  nebulization Given 2/3/25 1553)     Discharge Medication List as of 2/3/2025  5:02 PM        START taking these medications    Details   albuterol 90 mcg/actuation inhaler Inhale 2 puffs every 6 hours if needed for wheezing or shortness of breath., Starting Mon 2/3/2025, Normal      promethazine-DM (Phenergan-DM) 6.25-15 mg/5 mL syrup Take 5 mL by mouth 4 times a day as needed for cough for up to 7 days., Starting Mon 2/3/2025, Until Mon 2/10/2025 at 2359, Normal           I estimate there is LOW risk for EPIGLOTTITIS, PNEUMONIA, MENINGITIS, OR URINARY TRACT INFECTION, thus I consider the discharge disposition reasonable. Also, there is no evidence for peritonitis, sepsis, or toxicity. We have discussed the diagnosis and risks, and we agree with discharging home to follow-up with their primary doctor. We also discussed returning to the Emergency Department immediately if new or worsening symptoms occur. We have discussed the symptoms which are most concerning (e.g., changing or worsening pain, trouble swallowing or breathing, neck stiffness, fever) that necessitate immediate return.    Patient not have pneumonia in the right upper lobe, had a long discussion with mom  and the patient they will continue on current antibiotic regimen.  Will follow with PCP 1 to 2 days for reevaluation.  Was discharged home in stable condition.  Will start on cough syrup, albuterol inhaler.  Discharged home stable condition.    All of the patient's questions were answered to the best of my ability.  Patient states understanding that they have been screened for an emergency today and we have not found any etiology of symptoms that requires emergent treatment or admission to the hospital at this point. They understand that they have not had definitive care day and require follow-up for treatment of their condition. They also state understanding that they may have an emergent condition that may potentially have not of detected at this  visit and they must return to the emergency department if they develop any worsening of symptoms or new complaints.      I have evaluated this patient, my supervising physician was available for consultation.              Critical Care: Not warranted at this time        This chart was completed using voice recognition transcription software. Please excuse any errors of transcription including grammatical, punctuation, syntax and spelling errors.  Please contact me with any questions regarding this chart.    Procedure  Procedures     ANTHONY Dueñas-BLANK  02/03/25 6306

## 2025-02-11 ENCOUNTER — APPOINTMENT (OUTPATIENT)
Dept: PRIMARY CARE | Facility: CLINIC | Age: 25
End: 2025-02-11
Payer: COMMERCIAL

## 2025-02-11 VITALS
OXYGEN SATURATION: 96 % | WEIGHT: 214 LBS | DIASTOLIC BLOOD PRESSURE: 70 MMHG | HEIGHT: 74 IN | BODY MASS INDEX: 27.46 KG/M2 | SYSTOLIC BLOOD PRESSURE: 112 MMHG | HEART RATE: 111 BPM

## 2025-02-11 DIAGNOSIS — Z00.00 ROUTINE ADULT HEALTH MAINTENANCE: ICD-10-CM

## 2025-02-11 DIAGNOSIS — F41.8 ANXIETY WITH DEPRESSION: ICD-10-CM

## 2025-02-11 DIAGNOSIS — J18.9 ATYPICAL PNEUMONIA: Primary | ICD-10-CM

## 2025-02-11 PROBLEM — J06.9 VIRAL URI WITH COUGH: Status: RESOLVED | Noted: 2025-01-31 | Resolved: 2025-02-11

## 2025-02-11 PROCEDURE — 1036F TOBACCO NON-USER: CPT | Performed by: NURSE PRACTITIONER

## 2025-02-11 PROCEDURE — 3008F BODY MASS INDEX DOCD: CPT | Performed by: NURSE PRACTITIONER

## 2025-02-11 PROCEDURE — 99214 OFFICE O/P EST MOD 30 MIN: CPT | Performed by: NURSE PRACTITIONER

## 2025-02-11 RX ORDER — METHYLPREDNISOLONE 4 MG/1
TABLET ORAL
Qty: 21 TABLET | Refills: 0 | Status: SHIPPED | OUTPATIENT
Start: 2025-02-11 | End: 2025-02-17

## 2025-02-11 ASSESSMENT — ENCOUNTER SYMPTOMS
SHORTNESS OF BREATH: 0
COLOR CHANGE: 0
SEIZURES: 0
HEADACHES: 0
BRUISES/BLEEDS EASILY: 0
DIFFICULTY URINATING: 0
CHILLS: 0
BACK PAIN: 0
ADENOPATHY: 0
FATIGUE: 1
DIARRHEA: 0
CONSTIPATION: 0
TROUBLE SWALLOWING: 0
PALPITATIONS: 0
CHEST TIGHTNESS: 1
DIZZINESS: 0
WOUND: 0
WEAKNESS: 0
SINUS PRESSURE: 0
MYALGIAS: 0
ABDOMINAL DISTENTION: 0
EYE PAIN: 0
ROS GI COMMENTS: POSITIVE FOR ACID REFLUX
JOINT SWELLING: 0
ABDOMINAL PAIN: 0
SINUS PAIN: 0
FEVER: 0
WHEEZING: 1
NAUSEA: 0
COUGH: 1

## 2025-02-11 ASSESSMENT — PATIENT HEALTH QUESTIONNAIRE - PHQ9
1. LITTLE INTEREST OR PLEASURE IN DOING THINGS: NOT AT ALL
2. FEELING DOWN, DEPRESSED OR HOPELESS: SEVERAL DAYS
SUM OF ALL RESPONSES TO PHQ9 QUESTIONS 1 AND 2: 1
10. IF YOU CHECKED OFF ANY PROBLEMS, HOW DIFFICULT HAVE THESE PROBLEMS MADE IT FOR YOU TO DO YOUR WORK, TAKE CARE OF THINGS AT HOME, OR GET ALONG WITH OTHER PEOPLE: NOT DIFFICULT AT ALL

## 2025-02-11 ASSESSMENT — COLUMBIA-SUICIDE SEVERITY RATING SCALE - C-SSRS
6. HAVE YOU EVER DONE ANYTHING, STARTED TO DO ANYTHING, OR PREPARED TO DO ANYTHING TO END YOUR LIFE?: NO
1. IN THE PAST MONTH, HAVE YOU WISHED YOU WERE DEAD OR WISHED YOU COULD GO TO SLEEP AND NOT WAKE UP?: NO
2. HAVE YOU ACTUALLY HAD ANY THOUGHTS OF KILLING YOURSELF?: NO

## 2025-02-11 NOTE — PROGRESS NOTES
Subjective   Patient ID: Ugo Castellanos is a 24 y.o. male who presents for ER Follow-up (ER visit on 2/3 for pneumonia pt states that his cough is still bad where he is vomiting).    Patient is seen today for follow-up of recent emergency room visit for pneumonia.  Patient was originally diagnosed at the end of January but then presented to the emergency department several days later due to issues with persistent fevers despite being on 2 antibiotics.  Patient states he completed his last oral antibiotic yesterday and denies any recurrent issues with fevers.  He does admit to some persistent issues with intermittent wheezing and states that today was the first day he has not vomited due to coughing.  Appetite reportedly stable and patient denies any chest pain/palpitations.  He is doing his best to stay hydrated as well.  Patient recently moved to [place] with his family with whom he lives with.  He was recently laid off as a medical assistant and is voicing increased stressors because of this.  He was also originally planning on applying to physician assistant school later this year but now is uncertain on how he would like to proceed as far as education goes.  Patient does report some issues with anxiety and depression.  He states he is interested in following up with a counselor but at this time is concerned about the cost.  Medications reviewed.  No other acute concerns voiced at this time.         Current Outpatient Medications on File Prior to Visit   Medication Sig Dispense Refill    albuterol 90 mcg/actuation inhaler Inhale 2 puffs every 6 hours if needed for wheezing or shortness of breath. 8 g 0    azithromycin (Zithromax) 250 mg tablet Take 500 mg (2 tabs) first day, take 1 tab once daily for four days following 6 tablet 0    cetirizine (ZyrTEC) 10 mg tablet Take 1 tablet (10 mg) by mouth once daily.      citalopram (CeleXA) 20 mg tablet Take 1 tablet (20 mg) by mouth early in the morning.. 90 tablet 1     lactobacillus (Culturelle) 10 billion cell capsule Take 1 capsule by mouth once daily.      [DISCONTINUED] azithromycin (Zithromax) 500 mg tablet Take 1 tablet (500 mg) by mouth once daily for 5 days. 5 tablet 0    [DISCONTINUED] benzonatate (Tessalon) 100 mg capsule Take 1-2 capsules (100-200 mg) by mouth 3 times a day as needed for cough for up to 10 days. Do not crush or chew. 60 capsule 0    [DISCONTINUED] cefpodoxime (Vantin) 200 mg tablet Take 1 tablet (200 mg) by mouth 2 times a day for 10 days. 20 tablet 0    [DISCONTINUED] promethazine-DM (Phenergan-DM) 6.25-15 mg/5 mL syrup Take 5 mL by mouth 4 times a day as needed for cough for up to 7 days. 118 mL 0     No current facility-administered medications on file prior to visit.       Past Medical History:   Diagnosis Date    Depression         Past Surgical History:   Procedure Laterality Date    WISDOM TOOTH EXTRACTION          Family History   Problem Relation Name Age of Onset    Breast cancer Mother Chikis Castellanos     Colon cancer Paternal Grandfather          Review of Systems   Constitutional:  Positive for fatigue. Negative for chills and fever.   HENT:  Positive for congestion. Negative for dental problem, sinus pressure, sinus pain and trouble swallowing.    Eyes:  Negative for pain and visual disturbance.   Respiratory:  Positive for cough, chest tightness and wheezing. Negative for shortness of breath.    Cardiovascular:  Negative for chest pain, palpitations and leg swelling.   Gastrointestinal:  Negative for abdominal distention, abdominal pain, constipation, diarrhea and nausea.        Positive for acid reflux   Endocrine: Negative for cold intolerance and heat intolerance.   Genitourinary:  Negative for difficulty urinating.   Musculoskeletal:  Negative for back pain, gait problem, joint swelling and myalgias.   Skin:  Negative for color change, pallor, rash and wound.   Allergic/Immunologic: Positive for environmental allergies. Negative for  "food allergies.   Neurological:  Negative for dizziness, seizures, weakness and headaches.   Hematological:  Negative for adenopathy. Does not bruise/bleed easily.   Psychiatric/Behavioral:  Negative for behavioral problems.         Positive for anxiety/ depression (well controlled)   All other systems reviewed and are negative.      Objective   /70   Pulse (!) 111   Ht 1.88 m (6' 2\")   Wt 97.1 kg (214 lb)   SpO2 96%   BMI 27.48 kg/m²     Physical Exam  Constitutional:       General: He is not in acute distress.     Appearance: Normal appearance. He is not toxic-appearing.   HENT:      Head: Normocephalic and atraumatic.      Right Ear: External ear normal.      Left Ear: External ear normal.      Nose: Nose normal.      Mouth/Throat:      Mouth: Mucous membranes are moist.      Pharynx: Oropharynx is clear.   Eyes:      Extraocular Movements: Extraocular movements intact.      Conjunctiva/sclera: Conjunctivae normal.   Cardiovascular:      Rate and Rhythm: Normal rate and regular rhythm.      Pulses: Normal pulses.      Heart sounds: Normal heart sounds. No murmur heard.  Pulmonary:      Effort: Pulmonary effort is normal.      Breath sounds: Normal breath sounds. No wheezing.      Comments: Clear but diminished throughout.  Abdominal:      General: Bowel sounds are normal.      Palpations: Abdomen is soft.   Musculoskeletal:         General: No swelling. Normal range of motion.      Cervical back: Normal range of motion and neck supple.   Skin:     General: Skin is warm and dry.   Neurological:      General: No focal deficit present.      Mental Status: He is alert and oriented to person, place, and time. Mental status is at baseline.      Cranial Nerves: No cranial nerve deficit.      Motor: No weakness.   Psychiatric:         Mood and Affect: Mood normal.         Behavior: Behavior normal.         Thought Content: Thought content normal.         Judgment: Judgment normal.         Assessment/Plan "   Problem List Items Addressed This Visit             ICD-10-CM    Anxiety with depression F41.8     Patient states that he is interested in following up with counseling services but has minimal insurance coverage at this time.  Will reach out to office counseling provider discussed past labs additional recommendations.         Routine adult health maintenance Z00.00     No indication for screening blood work at this time.  Will continue to monitor as appropriate         Atypical pneumonia - Primary J18.9     Will initiate Medrol Dosepak due to persistent issues with coughing and wheezing.  No indication for additional antibiotics at this time.  Patient to continue to utilize albuterol inhaler as needed for additional symptom control.  He has been encouraged to rest, stay hydrated and notify provider for any persistent/worsening respiratory/infection concerns.         Relevant Medications    methylPREDNISolone (Medrol Dospak) 4 mg tablets

## 2025-02-11 NOTE — ASSESSMENT & PLAN NOTE
Will initiate Medrol Dosepak due to persistent issues with coughing and wheezing.  No indication for additional antibiotics at this time.  Patient to continue to utilize albuterol inhaler as needed for additional symptom control.  He has been encouraged to rest, stay hydrated and notify provider for any persistent/worsening respiratory/infection concerns.

## 2025-02-11 NOTE — PATIENT INSTRUCTIONS
There are several ways to treat a cough in adults, including:  Hydration  Drink plenty of fluids, like water, broth, tea, or juice, to thin mucus and soothe your throat.   Lozenges and hard candies  Suck on cough drops or hard candies to soothe a dry cough or irritated throat.   Honey  A teaspoon of honey can help loosen a cough, but don't give it to children under one year old.   Humidifier  Use a cool-mist humidifier or take a steamy shower to add moisture to the air.   Avoid irritants  Try to reduce exposure to things that trigger your cough, like dust, smoke, and other pollutants.   Cough medicine  Take over-the-counter cough medicines that contain guaifenesin.  This will help to thin secretions/loosen phlegm  Rest  Stay home and avoid contact with others if you have a high temperature or don't feel well.     You should call your healthcare provider if your cough:   -Won't go away  -Is accompanied by wheezing  -Is accompanied by a fever over 101.5 Fahrenheit or fever that lasts more than a day or two  -Is accompanied by chills

## 2025-02-11 NOTE — ASSESSMENT & PLAN NOTE
Patient states that he is interested in following up with counseling services but has minimal insurance coverage at this time.  Will reach out to office counseling provider discussed past labs additional recommendations.

## 2025-05-09 ENCOUNTER — OFFICE VISIT (OUTPATIENT)
Dept: PRIMARY CARE | Facility: CLINIC | Age: 25
End: 2025-05-09
Payer: MEDICAID

## 2025-05-09 ENCOUNTER — E-CONSULT (OUTPATIENT)
Dept: NEUROLOGY | Facility: HOSPITAL | Age: 25
End: 2025-05-09
Payer: MEDICAID

## 2025-05-09 VITALS
BODY MASS INDEX: 27.46 KG/M2 | HEIGHT: 74 IN | OXYGEN SATURATION: 98 % | HEART RATE: 91 BPM | WEIGHT: 214 LBS | SYSTOLIC BLOOD PRESSURE: 110 MMHG | DIASTOLIC BLOOD PRESSURE: 70 MMHG

## 2025-05-09 DIAGNOSIS — G44.019 EPISODIC CLUSTER HEADACHE, NOT INTRACTABLE: Primary | ICD-10-CM

## 2025-05-09 DIAGNOSIS — J30.89 ENVIRONMENTAL AND SEASONAL ALLERGIES: ICD-10-CM

## 2025-05-09 DIAGNOSIS — F41.8 ANXIETY WITH DEPRESSION: ICD-10-CM

## 2025-05-09 PROBLEM — J18.9 ATYPICAL PNEUMONIA: Status: RESOLVED | Noted: 2025-02-11 | Resolved: 2025-05-09

## 2025-05-09 PROCEDURE — 99213 OFFICE O/P EST LOW 20 MIN: CPT | Performed by: NURSE PRACTITIONER

## 2025-05-09 PROCEDURE — 1036F TOBACCO NON-USER: CPT | Performed by: NURSE PRACTITIONER

## 2025-05-09 PROCEDURE — 3008F BODY MASS INDEX DOCD: CPT | Performed by: NURSE PRACTITIONER

## 2025-05-09 ASSESSMENT — ENCOUNTER SYMPTOMS
ADENOPATHY: 0
ROS GI COMMENTS: POSITIVE FOR ACID REFLUX
EYE PAIN: 0
SEIZURES: 0
COUGH: 0
TROUBLE SWALLOWING: 0
WOUND: 0
CONSTIPATION: 0
DIZZINESS: 0
PALPITATIONS: 0
CHILLS: 0
FATIGUE: 0
DIARRHEA: 0
ABDOMINAL PAIN: 0
BACK PAIN: 0
BRUISES/BLEEDS EASILY: 0
FEVER: 0
WEAKNESS: 0
COLOR CHANGE: 0
MYALGIAS: 0
NAUSEA: 0
SHORTNESS OF BREATH: 0
WHEEZING: 0
HEADACHES: 0
DIFFICULTY URINATING: 0
JOINT SWELLING: 0
ABDOMINAL DISTENTION: 0

## 2025-05-09 NOTE — ASSESSMENT & PLAN NOTE
Patient to continue to utilize Nurtec as needed for migraine cessation.  E consult placed today for additional recommendations regarding preventative care of strong trigger appears to be allergy related

## 2025-05-09 NOTE — ASSESSMENT & PLAN NOTE
Patient states that he is interested in following up with counseling services; referral placed at this time.  He is to continue current citalopram dosing and notify provider for any persistent/worsening mood concerns.  Good support system reported overall

## 2025-05-09 NOTE — ASSESSMENT & PLAN NOTE
Encouraged patient to continue to utilize oral antihistamine and nasal steroidal spray.  New referral for allergy placed at this time for additional treatment recommendations

## 2025-05-09 NOTE — PROGRESS NOTES
"Subjective   Patient ID: Ugo Castellanos is a 24 y.o. male who presents for Headache (Light sensitive, dizzy drowsiness and vomited 1 time for about 1 week).    Patient is seen today due to concerns of headache and seasonal allergies.  Patient reports that he was diagnosed with cluster headaches when he lived in [place].  He was given a prescription for Nurtec which does help to some extent degrades the length/severity of symptoms.  Patient states that around this time every year this is when his headaches flareup the most and he is now almost daily.  He appears to be interested in trialing a preventative migraine medication in addition to seeing an allergist as this appears to be 1 trigger in particular.  He states that he takes different antihistamines but typically takes 1 nightly.  He denies any acute infection concerns but does report stuffy nose/some congestion.  No reported issues with coughing, wheezing or shortness of breath.  Patient requesting new referral be placed for an office counseling due to some previous depression/stress.  He continues on citalopram with relatively good effect as patient states most days he is doing okay overall but about 2 days a week or \"bad\".  Good family support reported.  Medications reviewed.  No other acute concerns voiced at this time           Current Outpatient Medications on File Prior to Visit   Medication Sig Dispense Refill    albuterol 90 mcg/actuation inhaler Inhale 2 puffs every 6 hours if needed for wheezing or shortness of breath. 8 g 0    cetirizine (ZyrTEC) 10 mg tablet Take 1 tablet (10 mg) by mouth once daily.      lactobacillus (Culturelle) 10 billion cell capsule Take 1 capsule by mouth once daily.      [DISCONTINUED] azithromycin (Zithromax) 250 mg tablet Take 500 mg (2 tabs) first day, take 1 tab once daily for four days following 6 tablet 0    [DISCONTINUED] rimegepant (Nurtec ODT) 75 mg tablet,disintegrating Dissolve 1 tablet (75 mg) in the mouth once " "daily as needed (headaches).      citalopram (CeleXA) 20 mg tablet Take 1 tablet (20 mg) by mouth early in the morning.. 90 tablet 1     No current facility-administered medications on file prior to visit.       Past Medical History:   Diagnosis Date    Depression         Past Surgical History:   Procedure Laterality Date    WISDOM TOOTH EXTRACTION          Family History   Problem Relation Name Age of Onset    Breast cancer Mother Chikis Castellanos     Colon cancer Paternal Grandfather          Review of Systems   Constitutional:  Negative for chills, fatigue and fever.   HENT:  Negative for dental problem and trouble swallowing.    Eyes:  Negative for pain and visual disturbance.   Respiratory:  Negative for cough, shortness of breath and wheezing.    Cardiovascular:  Negative for chest pain, palpitations and leg swelling.   Gastrointestinal:  Negative for abdominal distention, abdominal pain, constipation, diarrhea and nausea.        Positive for acid reflux   Endocrine: Negative for cold intolerance and heat intolerance.   Genitourinary:  Negative for difficulty urinating.   Musculoskeletal:  Negative for back pain, gait problem, joint swelling and myalgias.   Skin:  Negative for color change, pallor, rash and wound.   Allergic/Immunologic: Positive for environmental allergies. Negative for food allergies.   Neurological:  Negative for dizziness, seizures, weakness and headaches.   Hematological:  Negative for adenopathy. Does not bruise/bleed easily.   Psychiatric/Behavioral:  Negative for behavioral problems.         Positive for anxiety/ depression    All other systems reviewed and are negative.      Objective   /70   Pulse 91   Ht 1.88 m (6' 2\")   Wt 97.1 kg (214 lb)   SpO2 98%   BMI 27.48 kg/m²     Physical Exam  Constitutional:       General: He is not in acute distress.     Appearance: Normal appearance. He is not toxic-appearing.   HENT:      Head: Normocephalic and atraumatic.      Right Ear: " External ear normal.      Left Ear: External ear normal.      Nose: Nose normal.      Mouth/Throat:      Mouth: Mucous membranes are moist.      Pharynx: Oropharynx is clear.   Eyes:      Extraocular Movements: Extraocular movements intact.      Conjunctiva/sclera: Conjunctivae normal.   Cardiovascular:      Rate and Rhythm: Normal rate and regular rhythm.      Pulses: Normal pulses.      Heart sounds: Normal heart sounds. No murmur heard.  Pulmonary:      Effort: Pulmonary effort is normal.      Breath sounds: Normal breath sounds. No wheezing.   Musculoskeletal:         General: No swelling. Normal range of motion.      Cervical back: Normal range of motion and neck supple.   Skin:     General: Skin is warm and dry.   Neurological:      General: No focal deficit present.      Mental Status: He is alert and oriented to person, place, and time. Mental status is at baseline.      Cranial Nerves: No cranial nerve deficit.      Motor: No weakness.   Psychiatric:         Mood and Affect: Mood normal.         Behavior: Behavior normal.         Thought Content: Thought content normal.         Judgment: Judgment normal.         Assessment/Plan   Problem List Items Addressed This Visit           ICD-10-CM    Anxiety with depression F41.8    Patient states that he is interested in following up with counseling services; referral placed at this time.  He is to continue current citalopram dosing and notify provider for any persistent/worsening mood concerns.  Good support system reported overall         Relevant Orders    Follow Up In [facility] - [facility]    Environmental and seasonal allergies J30.89    Encouraged patient to continue to utilize oral antihistamine and nasal steroidal spray.  New referral for allergy placed at this time for additional treatment recommendations         Relevant Orders    Referral to Allergy    Episodic cluster headache, not intractable - Primary  G44.019    Patient to continue to utilize Nurtec as needed for migraine cessation.  E consult placed today for additional recommendations regarding preventative care of strong trigger appears to be allergy related         Relevant Medications    rimegepant (Nurtec ODT) 75 mg tablet,disintegrating    Other Relevant Orders    E-consult to Neurology

## 2025-05-09 NOTE — PATIENT INSTRUCTIONS
Please trial over-the-counter nasal sprays such as Flonase or Nasacort.  Claritin or Zyrtec may also be tried in combination with the nasal sprays.  You may also try over-the-counter decongestants but please use this sparingly as this can also raise your blood pressure.

## 2025-05-09 NOTE — PROGRESS NOTES
Headache E-Consult  Date:  05/09/25     Reason for E-Consult:  cluster headaches  Requesting Provider:  Bonnie Lugo  Diagnosis:    1. Episodic cluster headache, not intractable         Per Chart Review:    Mr. Ugo Castellanos is a 24 y.o. male, with suspected Cluster headache    Per referring provider, patient has been having Daily headache days  that have a cluster phenotype.  Has been trialed on Nurtec. Seasonally has increase in typical cluster headaches until they become daily, has been diagnosed and treated with Nurtec in the past. Would like a preventative.     Medical History[1]  Surgical History[2]  Current Medications[3]    Recommendations:  - Would start verapamil 120 mg xr at night for prevention . In addition recommend Emgality 300 mg (cluster dose) monthly during cluster season. Once the cluster stops can stop both. Recommend keeping last dose on Hand to use next spring. Next spring start with Just the Emgality. In order to shorten the duration of the clustyer season recommend medrol dose pack. Use [employer] website for additional hints and tips, especially for obtaining oxygen, which can be tricky.   - Would start Imitrex nasal or injectable  for acute treatment.  The duration od cluster headaches is usually to quick for the Nurtec to be maximally effective. The non oral medications are preferred due to their quicker onset. Oxygen in a non rebreather face mask, at 15 L of 100% for 15 to 20 minutes  - If headaches are not controlled with these interventions, can [id number] or consider referral for an in-person evaluation.     Formal Referral Recommended:  Not needed at this time    , time spent in interprofessional consultation including review of pertinent medical records, laboratory studies, medication profile and pathology specimens.  Greater than 50% of the total service was spent in medical consultative verbal or internet discussion.    Please do not respond directly to this E-Consult  message as any comments will then show up as a new E-Consult and won't necessarily be seen be me if I'm not on call that day.  If you have questions staff message me directly.    Cadence Zapata MD             [1]   Past Medical History:  Diagnosis Date    Depression    [2]   Past Surgical History:  Procedure Laterality Date    WISDOM TOOTH EXTRACTION     [3]   Current Outpatient Medications:     albuterol 90 mcg/actuation inhaler, Inhale 2 puffs every 6 hours if needed for wheezing or shortness of breath., Disp: 8 g, Rfl: 0    cetirizine (ZyrTEC) 10 mg tablet, Take 1 tablet (10 mg) by mouth once daily., Disp: , Rfl:     citalopram (CeleXA) 20 mg tablet, Take 1 tablet (20 mg) by mouth early in the morning.., Disp: 90 tablet, Rfl: 1    lactobacillus (Culturelle) 10 billion cell capsule, Take 1 capsule by mouth once daily., Disp: , Rfl:     rimegepant (Nurtec ODT) 75 mg tablet,disintegrating, Dissolve 1 tablet (75 mg) in the mouth once daily as needed (headaches)., Disp: 30 tablet, Rfl: 0

## 2025-05-22 ENCOUNTER — APPOINTMENT (OUTPATIENT)
Dept: PRIMARY CARE | Facility: CLINIC | Age: 25
End: 2025-05-22
Payer: MEDICAID

## 2025-05-22 ENCOUNTER — DOCUMENTATION (OUTPATIENT)
Dept: BEHAVIORAL HEALTH | Facility: CLINIC | Age: 25
End: 2025-05-22

## 2025-05-22 NOTE — PROGRESS NOTES
[facility] Psychiatry Consult Note     Ugo Castellanos is a 24 y.o. y.o., referred to Collaborative Care for symptoms of depression and anxiety. I have reviewed the patient with the behavioral health manager and reviewed the patient's electronic record.    GAD7 - 12  PHQ9 - 12    He just graduated from [employer] with a degree in biomedical science and applying for PA school.  He has moved back home.  Parents are high achieving and dad is a Colonel in the .  He moved around as a kid.  In middle school and high school he developed more perfectionism, comparing himself to others all the time.  He has some fixed ways of doing things like when he is eating and things have to be done in a particular order.  He has a history of making lists and schedules.  He has existential dread sometimes.  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      He is social.  He has poor stress tolerance though.  He notes in the past five years when things go wrong he has had suicidal thoughts come to mind though he denies intent or plan.  They seem to cycle in frequency and intensity. In jorge year of college he struggled more with depression - not leaving his room or going to class and was started on Lexapro.  It helped some initially.  Negative mood turns inward and he feels he is lazy.  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      He sleeps okay.  He had some counseling in college.  He is working full time in a medical office like an MA job.  He feels dad turns to alcohol when depressed, though Ugo denies using himself.  He feels there is more tension in parents' relationship recently.  He lives with mom, dad, brother (-2).  No family history.  He drinks about once per month.      Recommendations:   - taper off Celexa, 10mg daily x1 week and stop  - trial Prozac 20mg daily for anxiety symptoms.  Common side effects are mild GI upset and/or dizziness, which should resolve within 1-2 weeks of  starting/increasing the medication. Should see full effect of a dose after 4-6 weeks on that dose.  All SSRI carry a warning about suicidality in young adults and if he has suicidal thoughts on any medication he should reach out to 911/ER and let the office know.    - If tolerating well, but still anxious can increase by 10-20mg daily every month, up to 80mg daily.     - May consider Zoloft if this does not improve   - Patient will continue to follow with behavioral health case management.    The above treatment considerations and suggestions are based on consultations with the patient's care manager and a review of information available in the electronic medical record. I have not personally examined the patient. All recommendations should be implemented with consideration of the patient's relevant prior history and current clinical status. Please feel free to contact me with any questions about the care of this patient. I can be reached via the [facility] or Epic/Haiku messenger.

## 2025-05-27 ASSESSMENT — ANXIETY QUESTIONNAIRES
2. NOT BEING ABLE TO STOP OR CONTROL WORRYING: NEARLY EVERY DAY
1. FEELING NERVOUS, ANXIOUS, OR ON EDGE: NEARLY EVERY DAY
3. WORRYING TOO MUCH ABOUT DIFFERENT THINGS: NEARLY EVERY DAY
6. BECOMING EASILY ANNOYED OR IRRITABLE: MORE THAN HALF THE DAYS
4. TROUBLE RELAXING: SEVERAL DAYS
7. FEELING AFRAID AS IF SOMETHING AWFUL MIGHT HAPPEN: NOT AT ALL
5. BEING SO RESTLESS THAT IT IS HARD TO SIT STILL: NOT AT ALL
IF YOU CHECKED OFF ANY PROBLEMS ON THIS QUESTIONNAIRE, HOW DIFFICULT HAVE THESE PROBLEMS MADE IT FOR YOU TO DO YOUR WORK, TAKE CARE OF THINGS AT HOME, OR GET ALONG WITH OTHER PEOPLE: SOMEWHAT DIFFICULT
GAD7 TOTAL SCORE: 12

## 2025-05-27 ASSESSMENT — PATIENT HEALTH QUESTIONNAIRE - PHQ9
10. IF YOU CHECKED OFF ANY PROBLEMS, HOW DIFFICULT HAVE THESE PROBLEMS MADE IT FOR YOU TO DO YOUR WORK, TAKE CARE OF THINGS AT HOME, OR GET ALONG WITH OTHER PEOPLE: SOMEWHAT DIFFICULT
1. LITTLE INTEREST OR PLEASURE IN DOING THINGS: SEVERAL DAYS
6. FEELING BAD ABOUT YOURSELF - OR THAT YOU ARE A FAILURE OR HAVE LET YOURSELF OR YOUR FAMILY DOWN: NEARLY EVERY DAY
2. FEELING DOWN, DEPRESSED OR HOPELESS: MORE THAN HALF THE DAYS
SUM OF ALL RESPONSES TO PHQ9 QUESTIONS 1 & 2: 3
SUM OF ALL RESPONSES TO PHQ QUESTIONS 1-9: 12
8. MOVING OR SPEAKING SO SLOWLY THAT OTHER PEOPLE COULD HAVE NOTICED. OR THE OPPOSITE, BEING SO FIGETY OR RESTLESS THAT YOU HAVE BEEN MOVING AROUND A LOT MORE THAN USUAL: SEVERAL DAYS
5. POOR APPETITE OR OVEREATING: NOT AT ALL
9. THOUGHTS THAT YOU WOULD BE BETTER OFF DEAD, OR OF HURTING YOURSELF: NEARLY EVERY DAY
7. TROUBLE CONCENTRATING ON THINGS, SUCH AS READING THE NEWSPAPER OR WATCHING TELEVISION: SEVERAL DAYS
3. TROUBLE FALLING OR STAYING ASLEEP: NOT AT ALL
4. FEELING TIRED OR HAVING LITTLE ENERGY: SEVERAL DAYS
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
## 2025-05-29 DIAGNOSIS — F41.8 ANXIETY WITH DEPRESSION: Primary | ICD-10-CM

## 2025-05-29 RX ORDER — FLUOXETINE 20 MG/1
20 CAPSULE ORAL DAILY
Qty: 30 CAPSULE | Refills: 2 | Status: SHIPPED | OUTPATIENT
Start: 2025-05-29 | End: 2025-07-28

## 2025-05-29 NOTE — PROGRESS NOTES
"Updated recommendations per patient psychiatrist, Dr. Olivo received  \"Recommendations:   - taper off Celexa, 10mg daily x1 week and stop  - trial Prozac 20mg daily for anxiety symptoms.  Common side effects are mild GI upset and/or dizziness, which should resolve within 1-2 weeks of starting/increasing the medication. Should see full effect of a dose after 4-6 weeks on that dose.  All SSRI carry a warning about suicidality in young adults and if he has suicidal thoughts on any medication he should reach out to 911/ER and let the office know.    - If tolerating well, but still anxious can increase by 10-20mg daily every month, up to 80mg daily.     - May consider Zoloft if this does not improve   - Patient will continue to follow with behavioral health case management'  "

## 2025-06-03 ENCOUNTER — APPOINTMENT (OUTPATIENT)
Dept: PRIMARY CARE | Facility: CLINIC | Age: 25
End: 2025-06-03
Payer: MEDICAID

## 2025-06-04 NOTE — PROGRESS NOTES
"Collaborative Care (Co[facility])  Progress Note    Type of Interaction: In Office    Start Time: 1100    End Time: 1200        Appointment: Scheduled    Reason for Visit:   Chief Complaint   Patient presents with    Depression     Depression with anxiety    Jack has started the Prozac (has been taking about 3 days) with no noted side effects. Struggling with motivation around several things, including feeling he needs a better sleep schedule and more activity/better diet. Discussed goal setting and improving success by selecting small, manageable steps, which remains a struggle. Focus then was on getting his nervous system overall a bit more relaxed. Taught square breathing and other relaxation/stress reduction techniques that he can do daily (e.g., body scan meditation).       Interval History / Patient Symptoms:     Patient Health Questionnaire-9 Score: 12 (5/27/2025  9:25 AM)  MYNOR-7 Total Score: 12 (5/27/2025  9:26 AM)        Interventions Provided: Psychoeducation, Behavioral Activation, Motivational Interviewing, Strengths Exploration, Values Exploration, and Develop Coping Strategies      Progress Made: Minimum    Response to Intervention: Jack is very open/expressive of his emotions, gets stuck with \"shoulds\" in his life, struggling to enjoy present moment. A lot of self-judgment.        Plan:     Care Plan    There is no care plan documentation to display.         There are no Patient Instructions on file for this visit.      Follow Up / Next Appointment:  2 weeks      "

## 2025-06-06 ENCOUNTER — DOCUMENTATION WITH CHARGES (OUTPATIENT)
Dept: PRIMARY CARE | Facility: CLINIC | Age: 25
End: 2025-06-06
Payer: MEDICAID

## 2025-06-06 DIAGNOSIS — F41.8 ANXIETY WITH DEPRESSION: Primary | ICD-10-CM

## 2025-06-17 ENCOUNTER — APPOINTMENT (OUTPATIENT)
Dept: PRIMARY CARE | Facility: CLINIC | Age: 25
End: 2025-06-17
Payer: MEDICAID

## 2025-06-19 ENCOUNTER — DOCUMENTATION (OUTPATIENT)
Dept: BEHAVIORAL HEALTH | Facility: CLINIC | Age: 25
End: 2025-06-19
Payer: MEDICAID

## 2025-06-19 NOTE — PROGRESS NOTES
[facility] Psychiatry Follow-Up Note     Ugo Castellanos is a 24 y.o., referred to Collaborative Care for additional psychiatric recommendations due to ongoing mood and OCD symptoms. I have reviewed the patient with the behavioral health manager and reviewed the patient's electronic record. Patient was previously discussed in collaborative care in May and the recommendation was made to trial Prozac.     He notes when he feels better it is better than before treatment, but when he feels bad it seems worse than before.  He feels somewhat blunted and sedated with the medication.  He continues to report anxiety and having a lot of ruminating thoughts and checking behaviors.    Recommendations:   Behavioral health manager (M) to continue to engage with patient   Medications: stop Prozac 20mg  Trial Luvox ER 100mg nightly for OCD; Common side effects are GI upset and/or HA, insomnia, which should resolve within 1-2 weeks of starting/increasing the medication. Should see full effect of a dose after 6-8 weeks on that dose.  All SSRI carry a warning about suicidality in young adults and if he has suicidal thoughts on any medication he should reach out to 911/ER and let the office know.    - If tolerating well, but still symptomatic can increase by 50mg daily every 6-8 weeks, up to 300mg daily.    The above treatment considerations and suggestions are based on consultations with the patient's care manager and a review of information available in the electronic medical record. I have not personally examined the patient. All recommendations should be implemented with consideration of the patient's relevant prior history and current clinical status. Please feel free to reach out via Epic staff messaging with any questions about the care of this patient.

## 2025-06-20 DIAGNOSIS — F41.8 ANXIETY WITH DEPRESSION: Primary | ICD-10-CM

## 2025-06-20 RX ORDER — FLUVOXAMINE MALEATE 100 MG/1
100 TABLET, COATED ORAL NIGHTLY
Qty: 30 TABLET | Refills: 2 | Status: SHIPPED | OUTPATIENT
Start: 2025-06-20 | End: 2025-09-18

## 2025-06-20 NOTE — PROGRESS NOTES
"Per psychiatry recommendations,   \"Medications: stop Prozac 20mg  Trial Luvox ER 100mg nightly for OCD; Common side effects are GI upset and/or HA, insomnia, which should resolve within 1-2 weeks of starting/increasing the medication. Should see full effect of a dose after 6-8 weeks on that dose.  All SSRI carry a warning about suicidality in young adults and if he has suicidal thoughts on any medication he should reach out to 911/ER and let the office know.    - If tolerating well, but still symptomatic can increase by 50mg daily every 6-8 weeks, up to 300mg dailY\"  "

## 2025-06-24 NOTE — PROGRESS NOTES
"Collaborative Care ([facility])  Progress Note    Type of Interaction: In Office    Start Time: 1100    End Time: 1200        Appointment: Scheduled    Reason for Visit:   Chief Complaint   Patient presents with    Depression     Depression with anxiety, OCD    Today, Jack reports that since starting Prozac he has been very tired/drowsy. He feels that the medication has \"amplified either side of it; my highs are higher but my lows are lower\". He continues to become very upset at work for any mistake or perceived mistake, and is in a state of anxiety thinking about the mistakes he might make. He has been using square breathing when his nose is clear enough; states he is not making time for meditations or other relaxations. Introduced the concept of CBT and began working through the process with a particular activating event. He is  still really struggling with a lot of OCD issues, thus decision was made to discuss another medication switch with Dr. Maynard.      Interval History / Patient Symptoms:     No data recorded      Interventions Provided: Psychoeducation, Motivational Interviewing, Solution Focused Therapy, Strengths Exploration, Values Exploration, Develop Coping Strategies, and CBT      Progress Made: Minimum    Response to Intervention: Struggling to manage symptoms, open to medication change        Plan:     Care Plan    There is no care plan documentation to display.         There are no Patient Instructions on file for this visit.      Follow Up / Next Appointment:    2 weeks    "

## 2025-06-30 PROCEDURE — 99494 1ST/SBSQ PSYC COLLAB CARE: CPT | Performed by: NURSE PRACTITIONER

## 2025-06-30 PROCEDURE — 99493 SBSQ PSYC COLLAB CARE MGMT: CPT | Performed by: NURSE PRACTITIONER

## 2025-07-02 ENCOUNTER — APPOINTMENT (OUTPATIENT)
Dept: PRIMARY CARE | Facility: CLINIC | Age: 25
End: 2025-07-02
Payer: MEDICAID

## 2025-07-03 ENCOUNTER — APPOINTMENT (OUTPATIENT)
Dept: PRIMARY CARE | Facility: CLINIC | Age: 25
End: 2025-07-03
Payer: MEDICAID

## 2025-07-07 ENCOUNTER — DOCUMENTATION WITH CHARGES (OUTPATIENT)
Dept: PRIMARY CARE | Facility: CLINIC | Age: 25
End: 2025-07-07
Payer: MEDICAID

## 2025-07-07 DIAGNOSIS — F41.8 DEPRESSION WITH ANXIETY: Primary | ICD-10-CM

## 2025-07-23 ENCOUNTER — APPOINTMENT (OUTPATIENT)
Dept: PRIMARY CARE | Facility: CLINIC | Age: 25
End: 2025-07-23
Payer: MEDICAID

## 2025-07-25 NOTE — PROGRESS NOTES
"Collaborative Care ([facility])  Progress Note    Type of Interaction: In Office    Start Time: 1100    End Time: 1200        Appointment: Scheduled    Reason for Visit:   Chief Complaint   Patient presents with    Depression     Depression with anxiety    Jack reports that the Luvox is better than the last medication; he still feels his \"lows are very low\" but he is bouncing back more quickly. He reports a lot of feelings of exhaustion, but he is working 11 hour days and volunteering for 2 different organizations. 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 He initially took this as a negative, but is now seeing the positive in it. Struggling a great deal with his parent's relationship and is concerned they may split. Discussed what he can and cannot control and how to work towards acceptance of this. He also notes he is a bit more active and trying to eat better. He is only worrying at work about \"bigger issues\", and is not as concerned anymore about small mistakes.       Interval History / Patient Symptoms:     No data recorded      Interventions Provided: [place] Setting, Psychoeducation, and Interpersonal Therapy, CBT, coping skills      Progress Made: Mixed    Response to Intervention: Realizing that he should view sessions not as a chore to get over with, but as an opportunity. Also trying positive self-talk around going to work, which is helpful.        Plan:     Care Plan    There is no care plan documentation to display.         There are no Patient Instructions on file for this visit.      Follow Up / Next Appointment:    2 weeks    "

## 2025-08-05 ENCOUNTER — APPOINTMENT (OUTPATIENT)
Dept: PRIMARY CARE | Facility: CLINIC | Age: 25
End: 2025-08-05
Payer: COMMERCIAL

## 2025-08-08 ENCOUNTER — DOCUMENTATION WITH CHARGES (OUTPATIENT)
Dept: PRIMARY CARE | Facility: CLINIC | Age: 25
End: 2025-08-08
Payer: COMMERCIAL

## 2025-08-08 DIAGNOSIS — F41.8 ANXIETY WITH DEPRESSION: Primary | ICD-10-CM
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
## 2025-08-14 DIAGNOSIS — F41.8 ANXIETY WITH DEPRESSION: ICD-10-CM

## 2025-08-14 RX ORDER — FLUVOXAMINE MALEATE 100 MG/1
100 TABLET, COATED ORAL NIGHTLY
Qty: 30 TABLET | Refills: 0 | Status: SHIPPED | OUTPATIENT
Start: 2025-08-14 | End: 2025-11-12

## 2025-08-18 ENCOUNTER — OFFICE VISIT (OUTPATIENT)
Dept: PRIMARY CARE | Facility: CLINIC | Age: 25
End: 2025-08-18
Payer: COMMERCIAL

## 2025-08-18 ENCOUNTER — APPOINTMENT (OUTPATIENT)
Dept: PRIMARY CARE | Facility: CLINIC | Age: 25
End: 2025-08-18
Payer: COMMERCIAL

## 2025-08-18 ENCOUNTER — TELEPHONE (OUTPATIENT)
Dept: PRIMARY CARE | Facility: CLINIC | Age: 25
End: 2025-08-18

## 2025-08-18 VITALS
HEIGHT: 74 IN | SYSTOLIC BLOOD PRESSURE: 118 MMHG | HEART RATE: 99 BPM | OXYGEN SATURATION: 97 % | WEIGHT: 214 LBS | DIASTOLIC BLOOD PRESSURE: 70 MMHG | BODY MASS INDEX: 27.46 KG/M2

## 2025-08-18 DIAGNOSIS — K64.9 HEMORRHOIDS, UNSPECIFIED HEMORRHOID TYPE: Primary | ICD-10-CM

## 2025-08-18 PROCEDURE — 3008F BODY MASS INDEX DOCD: CPT | Performed by: NURSE PRACTITIONER

## 2025-08-18 PROCEDURE — 99213 OFFICE O/P EST LOW 20 MIN: CPT | Performed by: NURSE PRACTITIONER

## 2025-08-18 RX ORDER — DOCUSATE SODIUM 100 MG/1
100 CAPSULE, LIQUID FILLED ORAL 2 TIMES DAILY PRN
Qty: 60 CAPSULE | Refills: 2 | Status: SHIPPED | OUTPATIENT
Start: 2025-08-18 | End: 2025-11-16

## 2025-08-18 RX ORDER — HYDROCORTISONE ACETATE 25 MG/1
25 SUPPOSITORY RECTAL 2 TIMES DAILY PRN
Qty: 30 SUPPOSITORY | Refills: 0 | Status: SHIPPED | OUTPATIENT
Start: 2025-08-18 | End: 2025-09-17

## 2025-08-18 ASSESSMENT — ENCOUNTER SYMPTOMS
WHEEZING: 0
COUGH: 0
PALPITATIONS: 0
DIFFICULTY URINATING: 0
VOMITING: 0
FEVER: 0
NAUSEA: 0
ABDOMINAL PAIN: 0
FATIGUE: 0
COLOR CHANGE: 0
CHILLS: 0
SHORTNESS OF BREATH: 0
ANAL BLEEDING: 0
DYSURIA: 0

## 2025-08-20 ENCOUNTER — APPOINTMENT (OUTPATIENT)
Dept: ALLERGY | Facility: CLINIC | Age: 25
End: 2025-08-20
Payer: MEDICAID

## 2025-08-30 ENCOUNTER — OFFICE VISIT (OUTPATIENT)
Dept: URGENT CARE | Age: 25
End: 2025-08-30
Payer: COMMERCIAL

## 2025-08-31 ASSESSMENT — ENCOUNTER SYMPTOMS
SINUS PAIN: 1
SINUS PRESSURE: 1
COUGH: 1

## 2025-09-03 ENCOUNTER — APPOINTMENT (OUTPATIENT)
Dept: PRIMARY CARE | Facility: CLINIC | Age: 25
End: 2025-09-03
Payer: COMMERCIAL

## 2025-09-04 ENCOUNTER — DOCUMENTATION WITH CHARGES (OUTPATIENT)
Dept: PRIMARY CARE | Facility: CLINIC | Age: 25
End: 2025-09-04
Payer: COMMERCIAL

## 2025-09-04 DIAGNOSIS — F41.8 ANXIETY WITH DEPRESSION: Primary | ICD-10-CM

## 2025-09-05 ENCOUNTER — TELEPHONE (OUTPATIENT)
Dept: PRIMARY CARE | Facility: CLINIC | Age: 25
End: 2025-09-05
Payer: COMMERCIAL

## 2025-09-05 DIAGNOSIS — F41.8 ANXIETY WITH DEPRESSION: ICD-10-CM

## 2025-09-05 RX ORDER — FLUVOXAMINE MALEATE 100 MG/1
100 TABLET, COATED ORAL NIGHTLY
Qty: 90 TABLET | Refills: 0 | Status: SHIPPED | OUTPATIENT
Start: 2025-09-05 | End: 2025-12-04

## 2025-09-24 ENCOUNTER — APPOINTMENT (OUTPATIENT)
Dept: ALLERGY | Facility: CLINIC | Age: 25
End: 2025-09-24
Payer: MEDICAID

## 2025-11-04 ENCOUNTER — APPOINTMENT (OUTPATIENT)
Dept: PRIMARY CARE | Facility: CLINIC | Age: 25
End: 2025-11-04
Payer: COMMERCIAL